# Patient Record
Sex: MALE | Race: OTHER | Employment: STUDENT | URBAN - NONMETROPOLITAN AREA
[De-identification: names, ages, dates, MRNs, and addresses within clinical notes are randomized per-mention and may not be internally consistent; named-entity substitution may affect disease eponyms.]

---

## 2018-11-22 ENCOUNTER — HOSPITAL ENCOUNTER (EMERGENCY)
Age: 23
Discharge: HOME | End: 2018-11-22
Attending: EMERGENCY MEDICINE
Payer: COMMERCIAL

## 2018-11-22 VITALS
RESPIRATION RATE: 12 BRPM | HEART RATE: 74 BPM | TEMPERATURE: 98.8 F | OXYGEN SATURATION: 99 % | SYSTOLIC BLOOD PRESSURE: 142 MMHG | DIASTOLIC BLOOD PRESSURE: 98 MMHG

## 2018-11-22 DIAGNOSIS — J45.41 MODERATE PERSISTENT ASTHMA WITH EXACERBATION: Primary | ICD-10-CM

## 2018-11-22 PROCEDURE — 94664 DEMO&/EVAL PT USE INHALER: CPT

## 2018-11-22 PROCEDURE — 99284 EMERGENCY DEPT VISIT MOD MDM: CPT

## 2018-11-22 PROCEDURE — 6370000000 HC RX 637 (ALT 250 FOR IP): Performed by: EMERGENCY MEDICINE

## 2018-11-22 RX ORDER — ALBUTEROL SULFATE 90 UG/1
2 AEROSOL, METERED RESPIRATORY (INHALATION) 4 TIMES DAILY PRN
Qty: 1 INHALER | Refills: 1 | Status: SHIPPED | OUTPATIENT
Start: 2018-11-22

## 2018-11-22 RX ORDER — IPRATROPIUM BROMIDE AND ALBUTEROL SULFATE 2.5; .5 MG/3ML; MG/3ML
1 SOLUTION RESPIRATORY (INHALATION)
Status: DISCONTINUED | OUTPATIENT
Start: 2018-11-22 | End: 2018-11-22 | Stop reason: HOSPADM

## 2018-11-22 RX ADMIN — IPRATROPIUM BROMIDE AND ALBUTEROL SULFATE 1 AMPULE: .5; 3 SOLUTION RESPIRATORY (INHALATION) at 07:25

## 2018-11-22 NOTE — ED PROVIDER NOTES
[facility]  EMERGENCY DEPARTMENT ENCOUNTER   CHIEF COMPLAINT   Chief Complaint   Patient presents with    Shortness of Breath     onset this morning; pt states he has a hx of asthma and forgot his inhaler      HPI   Sam Matson is a 21 y.o. male who presents with shortness of breath. He has asthma and is from [address] [place] [address] and is visiting [place]. He did not take his inhaler. He is having some difficulties with asthma last 6 hours and also would like inhaler. No other current complaints. Nothing else because he forgot his inhaler. REVIEW OF SYSTEMS   Cardiac: Denies palpitations, Denies syncope  Respiratory: +SOB and wheezes  Neurologic: Denies syncope or LOC  GI: Denies Vomiting, Denies Diarrhea  General: Denies measured Fever  All other review of systems otherwise negative. PAST MEDICAL & SURGICAL HISTORY   Past Medical History:   Diagnosis Date    Asthma      History reviewed. No pertinent surgical history. CURRENT MEDICATIONS   Current Outpatient Rx   Medication Sig Dispense Refill    albuterol sulfate  (90 Base) MCG/ACT inhaler Inhale 2 puffs into the lungs 4 times daily as needed for Wheezing 1 Inhaler 1      ALLERGIES   No Known Allergies   SOCIAL & FAMILY HISTORY   Social History     Social History    Marital status: N/A     Spouse name: N/A    Number of children: N/A    Years of education: N/A     Social History Main Topics    Smoking status: Never Smoker    Smokeless tobacco: Never Used    Alcohol use No    Drug use: No    Sexual activity: Not Asked     Other Topics Concern    None     Social History Narrative    None     History reviewed. No pertinent family history.      PHYSICAL EXAM   VITAL SIGNS: BP (!) 142/98   Pulse 74   Temp 98.8 °F (37.1 °C) (Tympanic)   Resp 12   SpO2 99%    Constitutional: Well developed, well nourished, n acute distress   HENT: Atraumatic, wet mucus membranes  Neck: supple, no JVD   Respiratory: Lungs reveal diminished lung sounds bilaterally with just barely discernible wheeze  Cardiovascular: regular rate, no murmurs  GI: Soft, nontender, normal bowel sounds  Musculoskeletal: no bilateral LE edema, no acute deformities  Integument: Skin is warm and dry, no petechiae   Neurologic: Alert & oriented, normal speech  Psych: Pleasant affect, the patient does not appear anxious     RADIOLOGY/PROCEDURES   No orders to display     ED COURSE & MEDICAL DECISION MAKING   Pertinent Labs & Imaging studies reviewed and interpreted. (See chart for details)     Vitals:    11/22/18 0727   BP:    Pulse:    Resp: 12   Temp:    SpO2: 99%       Differential Diagnosis: asthma exacerbation    MDM: Patient progressed inhaler. We'll give patient a DuoNeb treatment prescription for inhaler. FINAL IMPRESSION   1.  Moderate persistent asthma with exacerbation      PLAN  Discharge  Electronically signed by: Elias Engle MD, 11/22/2018 7:34 AM  (This note was completed wth a voice recognition program)            Delaney Samson MD  11/22/18 3214

## 2018-11-23 ENCOUNTER — HOSPITAL ENCOUNTER (EMERGENCY)
Age: 23
Discharge: HOME | End: 2018-11-23
Attending: EMERGENCY MEDICINE
Payer: COMMERCIAL

## 2018-11-23 VITALS
HEART RATE: 81 BPM | TEMPERATURE: 98 F | RESPIRATION RATE: 16 BRPM | OXYGEN SATURATION: 98 % | SYSTOLIC BLOOD PRESSURE: 130 MMHG | DIASTOLIC BLOOD PRESSURE: 82 MMHG

## 2018-11-23 DIAGNOSIS — J45.41 MODERATE PERSISTENT ASTHMA WITH EXACERBATION: Primary | ICD-10-CM

## 2018-11-23 PROCEDURE — 99284 EMERGENCY DEPT VISIT MOD MDM: CPT

## 2018-11-23 PROCEDURE — 6370000000 HC RX 637 (ALT 250 FOR IP): Performed by: EMERGENCY MEDICINE

## 2018-11-23 RX ORDER — ALBUTEROL SULFATE 90 UG/1
2 AEROSOL, METERED RESPIRATORY (INHALATION) EVERY 6 HOURS PRN
Status: DISCONTINUED | OUTPATIENT
Start: 2018-11-23 | End: 2018-11-23 | Stop reason: HOSPADM

## 2018-11-23 RX ADMIN — ALBUTEROL SULFATE 2 PUFF: 90 AEROSOL, METERED RESPIRATORY (INHALATION) at 02:13
